# Patient Record
Sex: FEMALE | Race: WHITE | NOT HISPANIC OR LATINO | Employment: OTHER | URBAN - METROPOLITAN AREA
[De-identification: names, ages, dates, MRNs, and addresses within clinical notes are randomized per-mention and may not be internally consistent; named-entity substitution may affect disease eponyms.]

---

## 2017-04-25 ENCOUNTER — ALLSCRIPTS OFFICE VISIT (OUTPATIENT)
Dept: OTHER | Facility: OTHER | Age: 67
End: 2017-04-25

## 2017-04-25 ENCOUNTER — TRANSCRIBE ORDERS (OUTPATIENT)
Dept: ADMINISTRATIVE | Facility: HOSPITAL | Age: 67
End: 2017-04-25

## 2017-04-25 DIAGNOSIS — Z12.31 VISIT FOR SCREENING MAMMOGRAM: Primary | ICD-10-CM

## 2017-04-25 DIAGNOSIS — E89.0 POSTPROCEDURAL HYPOTHYROIDISM: ICD-10-CM

## 2017-04-25 DIAGNOSIS — Z13.220 ENCOUNTER FOR SCREENING FOR LIPOID DISORDERS: ICD-10-CM

## 2017-04-25 DIAGNOSIS — Z13.0 ENCOUNTER FOR SCREENING FOR DISEASES OF THE BLOOD AND BLOOD-FORMING ORGANS AND CERTAIN DISORDERS INVOLVING THE IMMUNE MECHANISM: ICD-10-CM

## 2017-04-25 DIAGNOSIS — Z12.31 ENCOUNTER FOR SCREENING MAMMOGRAM FOR MALIGNANT NEOPLASM OF BREAST: ICD-10-CM

## 2017-04-29 ENCOUNTER — LAB CONVERSION - ENCOUNTER (OUTPATIENT)
Dept: OTHER | Facility: OTHER | Age: 67
End: 2017-04-29

## 2017-04-29 LAB
BASOPHILS # BLD AUTO: 0.5 %
BASOPHILS # BLD AUTO: 21 CELLS/UL (ref 0–200)
CHOLEST SERPL-MCNC: 260 MG/DL (ref 125–200)
CHOLEST/HDLC SERPL: 5.2 (CALC)
DEPRECATED RDW RBC AUTO: 12.6 % (ref 11–15)
EOSINOPHIL # BLD AUTO: 2.2 %
EOSINOPHIL # BLD AUTO: 90 CELLS/UL (ref 15–500)
HCT VFR BLD AUTO: 35.9 % (ref 35–45)
HDLC SERPL-MCNC: 50 MG/DL
HGB BLD-MCNC: 12.4 G/DL (ref 11.7–15.5)
LDL CHOLESTEROL (HISTORICAL): 172 MG/DL (CALC)
LYMPHOCYTES # BLD AUTO: 1554 CELLS/UL (ref 850–3900)
LYMPHOCYTES # BLD AUTO: 37.9 %
MCH RBC QN AUTO: 30 PG (ref 27–33)
MCHC RBC AUTO-ENTMCNC: 34.5 G/DL (ref 32–36)
MCV RBC AUTO: 87 FL (ref 80–100)
MONOCYTES # BLD AUTO: 369 CELLS/UL (ref 200–950)
MONOCYTES (HISTORICAL): 9 %
NEUTROPHILS # BLD AUTO: 2066 CELLS/UL (ref 1500–7800)
NEUTROPHILS # BLD AUTO: 50.4 %
NON-HDL-CHOL (CHOL-HDL) (HISTORICAL): 210 MG/DL (CALC)
PLATELET # BLD AUTO: 229 THOUSAND/UL (ref 140–400)
PMV BLD AUTO: 9.7 FL (ref 7.5–12.5)
RBC # BLD AUTO: 4.13 MILLION/UL (ref 3.8–5.1)
T4 TOTAL (HISTORICAL): 8.6 MCG/DL (ref 4.5–12)
TRIGL SERPL-MCNC: 190 MG/DL
TSH SERPL DL<=0.05 MIU/L-ACNC: 0.52 MIU/L (ref 0.4–4.5)
WBC # BLD AUTO: 4.1 THOUSAND/UL (ref 3.8–10.8)

## 2017-05-01 ENCOUNTER — GENERIC CONVERSION - ENCOUNTER (OUTPATIENT)
Dept: OTHER | Facility: OTHER | Age: 67
End: 2017-05-01

## 2017-05-25 ENCOUNTER — HOSPITAL ENCOUNTER (OUTPATIENT)
Dept: MAMMOGRAPHY | Facility: MEDICAL CENTER | Age: 67
Discharge: HOME | End: 2017-05-25
Payer: MEDICARE

## 2017-05-25 DIAGNOSIS — Z12.31 VISIT FOR SCREENING MAMMOGRAM: ICD-10-CM

## 2017-05-25 PROCEDURE — 77063 BREAST TOMOSYNTHESIS BI: CPT

## 2017-05-25 PROCEDURE — G0202 SCR MAMMO BI INCL CAD: HCPCS

## 2017-05-26 ENCOUNTER — GENERIC CONVERSION - ENCOUNTER (OUTPATIENT)
Dept: OTHER | Facility: OTHER | Age: 67
End: 2017-05-26

## 2018-01-10 NOTE — RESULT NOTES
Verified Results  (1) LIPID PANEL, FASTING 28Apr2017 02:22PM Reginald Estrada     Test Name Result Flag Reference   CHOLESTEROL, TOTAL 260 mg/dL H 125-200   HDL CHOLESTEROL 50 mg/dL  > OR = 46   TRIGLICERIDES 521 mg/dL H <150   LDL-CHOLESTEROL 172 mg/dL (calc) H <130   Desirable range <100 mg/dL for patients with CHD or  diabetes and <70 mg/dL for diabetic patients with  known heart disease  CHOL/HDLC RATIO 5 2 (calc) H < OR = 5 0   NON HDL CHOLESTEROL 210 mg/dL (calc) H    Target for non-HDL cholesterol is 30 mg/dL higher than   LDL cholesterol target  (1) CBC/PLT/DIFF 28Apr2017 02:22PM Reginald Estrada     Test Name Result Flag Reference   WHITE BLOOD CELL COUNT 4 1 Thousand/uL  3 8-10 8   RED BLOOD CELL COUNT 4 13 Million/uL  3 80-5 10   HEMOGLOBIN 12 4 g/dL  11 7-15 5   HEMATOCRIT 35 9 %  35 0-45 0   MCV 87 0 fL  80 0-100 0   MCH 30 0 pg  27 0-33 0   MCHC 34 5 g/dL  32 0-36 0   RDW 12 6 %  11 0-15 0   PLATELET COUNT 003 Thousand/uL  140-400   ABSOLUTE NEUTROPHILS 2066 cells/uL  5399-6870   ABSOLUTE LYMPHOCYTES 1554 cells/uL  850-3900   ABSOLUTE MONOCYTES 369 cells/uL  200-950   ABSOLUTE EOSINOPHILS 90 cells/uL     ABSOLUTE BASOPHILS 21 cells/uL  0-200   NEUTROPHILS 50 4 %     LYMPHOCYTES 37 9 %     MONOCYTES 9 0 %     EOSINOPHILS 2 2 %     BASOPHILS 0 5 %     MPV 9 7 fL  7 5-12 5     (1) THYROXINE 28Apr2017 02:22PM Reginald Estrada     Test Name Result Flag Reference   T4 (THYROXINE), TOTAL 8 6 mcg/dL  4 5-12 0     (Q) TSH, 3RD GENERATION 28Apr2017 02:22PM Reginald Estrada   REPORT COMMENT:  FASTING:YES     Test Name Result Flag Reference   TSH 0 52 mIU/L  0 40-4 50       Plan  Health Maintenance    · (1) LIPID PANEL, FASTING ; every 5 years;  Last 28Apr2017; Status:Active

## 2018-01-10 NOTE — RESULT NOTES
Verified Results  MAMMO SCREENING BILATERAL W 3D & CAD [id number] 07:28AM Carlos Morales Order Number: [id number]     Test Name Result Flag Reference   MAMMO SCREENING BILATERAL W 3D & CAD (Report)     Patient History:   Patient is postmenopausal and has history of other cancer at age    42  Family history of breast cancer in mother at age [de-identified]  Patient has never smoked  Patient's BMI is 20 9  Reason for exam: screening (asymptomatic)  Mammo Screening Bilateral W DBT and CAD: May 3, 2016 - Check In    #: [de-identified]   2D/3D Procedure   3D views: Bilateral MLO view(s) were taken  2D views: Bilateral CC view(s) were taken  Technologist: LOGAN Chavis (LOGAN)(M)   Prior study comparison: May 7, 2015, bilateral AMA dig scrn mamm    w/CAD performed at [place] [address]  May 6, 2014, bilateral digital screening mammogram performed at    [place] [address]  April 30, 2013,    bilateral digital screening mammogram performed at [place] [address]  April 27, 2012, bilateral    digital screening mammogram performed at [place] [address]  March 3, 2011, bilateral digital    screening mammogram performed at [place] [address]  There are scattered fibroglandular densities  A combination of    mediolateral oblique 3D tomographic slices as well as standard    two-dimensional orthogonal images were obtained  No new dominant soft tissue mass, architectural distortion or    suspicious calcifications are noted  The skin and nipple    structures are within normal limits  Stable nodular density    present in the right breast, UIQ  No mammographic evidence of malignancy  No    significant changes when compared with prior studies  ASSESSMENT: BiRad:2 - Benign     Recommendation:   Routine screening mammogram of both breasts in 1 year     8-10% of cancers will be missed on mammography  Management of a    palpable abnormality must be based on clinical grounds  Patients    will be notified of their results via letter from our facility  Accredited by [employer] and FDA       Transcription Location: LOGAN Sharma 98: [id number]     Risk Value(s):   Broer-Cuesdras 10 Year: 4 338%, Dennys-Ang Lifetime: 9 309%,    Myriad Table: 1 5%, HUDSON 5 Year: 2 8%, NCI Lifetime: 11 1%   Signed by:   Maria Liu MD   5/3/16

## 2018-01-11 NOTE — PROGRESS NOTES
Assessment    1  Encounter for preventive health examination (V70 0) (Z00 00)   2  Postsurgical hypothyroidism (244 0) (E89 0)   3  Left shoulder pain (719 41) (M25 512)   4  Screening for deficiency anemia (V78 1) (Z13 0)    Plan  Health Maintenance    · Multiple Vitamins Oral Tablet; Take one daily   · (1) OCCULT BLOOD, FECAL IMMUNOCHEMICAL TEST; Status:Active; Requested  for:11Apr2016;    · (1) TSH; Status:Active;  Requested for:11Apr2016;    · *VB-Depression Screening; Status:Resulted - Requires Verification;   Done: 11Apr2016  04:03PM   · Alcohol misuse can be a problem with advancing age ; Status:Complete;   Done:  11Apr2016   · Always use a seat belt and shoulder strap when riding or driving a motor vehicle ;  Status:Complete;   Done: 74EEG7282   · Brush your teeth freq1 and floss at least once a day ; Status:Complete;   Done:  49JOY9387   · Decreasing the stress in your life may help your condition improve ; Status:Complete;    Done: 61OXZ8320   · Diets that are low in carbohydrates and high in protein are very popular for weight loss ;  Status:Complete;   Done: 04NVQ6653   · Diets that are low in carbohydrates and high in protein are very popular for weight loss ;  Status:Complete;   Done: 86CGH3663   · Drink plenty of fluids ; Status:Complete;   Done: 68UMZ7436   · Eat a low fat and low cholesterol diet ; Status:Complete;   Done: 13WEU2467   · Eat a low fat and low cholesterol diet ; Status:Complete;   Done: 69QGS1151   · Regular aerobic exercise can help reduce stress ; Status:Complete;   Done: 80LAX0137   · Stretch and warm up your muscles during the first 10 minutes , then cool down your  muscles for the last 10 minutes of exercise ; Status:Complete;   Done: 87SPX1951   · There are many ways to reduce your risk of catching or spreading a sexually transmitted  Infection ; Status:Complete;   Done: 08IHN5589   · Use a sun block product with an SPF of 15 or more ; Status:Complete;   Done:  19VMP2473   · We recommend routine visits to a dentist ; Status:Complete;   Done: 19BXR5843   · We recommend that you follow these steps to lower your risk of osteoporosis  ;  Status:Complete;   Done: 38XMZ1506   · Call [phone] if: You find a new or different kind of lump in your breast ;  Status:Complete;   Done: 13BHO9269   · Call [phone] if: You have any bleeding from the vagina ; Status:Complete;    Done: 48CBF2865   · Call [phone] if: You have any warning signs of skin cancer ; Status:Complete;    Done: 32WLC9858   · Call 911 if: You experience a new kind of chest pain (angina) or pressure ;  Status:Complete;   Done: 62CCU7780   · Stop: Fluzone Intramuscular Suspension   · Stop: Pneumo (Pneumovax)   · Stop: Zoster (Zostavax)  Health Maintenance, Encounter for screening mammogram for malignant neoplasm of  breast    · MAMMO SCREENING BILATERAL W 3D & CAD; Status:Hold For - Scheduling; Requested  for:11Apr2016; Health Maintenance, Postsurgical hypothyroidism    · (1) COMPREHENSIVE METABOLIC PANEL; Status:Active; Requested for:11Apr2016;    · (1) LIPID PANEL, FASTING; Status:Active; Requested for:11Apr2016; Health Maintenance, Screening for deficiency anemia    · (1) CBC/PLT/DIFF; Status:Active; Requested for:11Apr2016;   Left shoulder pain    · Naproxen 500 MG Oral Tablet; 1 PO BID with food for 14 days then prn   · * XR SHOULDER 2+ VIEW LEFT; Status:Active; Requested for:11Apr2016;    · *1 - [facility] Physician Referral  Consult  Status: Active  Requested  for: 11Apr2016  Care Summary provided  : Yes    Discussion/Summary  health maintenance visit Currently, she eats a healthy diet and has an adequate exercise regimen  the risks and benefits of cervical cancer screening were discussed refused ordered hemoccult Breast cancer screening: self breast exam technique was taught and mammogram has been ordered  Colorectal cancer screening: fecal occult blood testing supplies were given   Screening lab work includes hemoglobin, glucose, lipid profile and thyroid function testing  The patient declines immunizations  She was advised to be evaluated by an optometrist and a dentist  Advice and education were given regarding nutrition, aerobic exercise, calcium supplements, vitamin D supplements, alcohol use, sunscreen use, self skin examination, helmet use and seat belt use  Chief Complaint  WORK PHYSICAL      History of Present Illness  HM, Adult Female: The patient is being seen for a health maintenance evaluation  The last health maintenance visit was 1 year(s) ago  Social History: Household members include domestic partner  She is unmarried  Work status: working full time and occupation:   The patient has never smoked cigarettes  She reports rare alcohol use  She has never used illicit drugs  General Health: She has regular dental visits  She denies vision problems  Immunizations status: not up to date   refuses flu shot and pneumonia shot and shingels shots  Lifestyle:  She consumes a diverse and healthy diet  She does not have any weight concerns  She exercises regularly  She does not use tobacco  She denies alcohol use  She denies drug use  Reproductive health: the patient is postmenopausal   she is sexually active  Screening: Cervical cancer screening includes uncertain timing of her last pap smear and refuses  Breast cancer screening includes a mammogram performed 4464, uncertain timing of her last clinical breast exam and monthly breast self-exams performed  She hasn't been previously screened for colorectal cancer  Metabolic screening includes lipid profile performed 2015 by endocrinology, glucose screening performed 2015 by endocrinology and thyroid function test performed 2015 bu endocrinology       Cardiovascular risk factors: no hypertension, no diabetes, no high LDL cholesterol, no low HDL cholesterol, no stress, no obesity, no tobacco use, no illicit drug use, no sedentary lifestyle and no family history of cardiovascular disease  General health risks: no previous breast cancer, no abnormal cervical cytology, no positive screening for human papilloma virus, no hormone therapy, no inflammatory bowel disease, no osteoporosis risk factors and no asbestos exposure  Safety elements used: seat belt, safe driving habits, sunscreen, smoke detector, carbon monoxide detector and hot water temperature less than 120 degrees F    HPI: Patient is here for PE for work Patient declines flu shot, refuses pap and breast exam patient wants a mammogram slip Patient is seeing eye doctor yearly Patient was there today for eye exam Patient had an injury in 8/2015 to the left shoulder Seh was at work sitting in a little chair near the ground when a toddler startled her by running into her and she fell onto her left shoulder Patient had pain initially but then it stopped However now for last 6 weeks she has pain Patient is able to flex shoulder but cannot rotate shoulder to take her coat off or hook her bra Patient has no radiation of the pain Patien tdenies any arm weakness Patient [facility] every June and was there in June 2015 and had all her labs done They fill her thyroid meds for one year at each visit   Shoulder Pain: The patient is being seen for an initial evaluation of shoulder pain  The history today is reported by the patient  The patient is right hand dominant  Left shoulder injury, This occurred 7 month(s) ago at work  The injury resulted from a fall  Symptoms:  shoulder pain, shoulder stiffness and decreased range of motion at the shoulder, but no localized bruising, no localized swelling, no localized redness, no localized warmth, no localized numbness and no localized weakness  The patient is currently experiencing symptoms  No associated symptoms are reported  The patient is not currently being treated for this problem   Pertinent medical history:  no juvenile rheumatoid arthritis, no psoriasis, no inflammatory bowel disease, no bleeding disorder, no chronic corticosteroid use and no prior shoulder dislocation  Family history:  no rheumatologic disease, no psoriasis and no inflammatory bowel disease  The patient is currently able to do activities of daily living with limitations  She was previously evaluated none  Review of Systems    Constitutional: No fever, no chills, feels well, no tiredness, no recent weight gain or weight loss  Eyes: no eye pain and no eyesight problems  ENT: no complaints of earache, no loss of hearing, no nose bleeds, no nasal discharge, no sore throat, no hoarseness  Cardiovascular: No complaints of slow heart rate, no fast heart rate, no chest pain, no palpitations, no leg claudication, no lower extremity edema  Respiratory: No complaints of shortness of breath, no wheezing, no cough, no SOB on exertion, no orthopnea, no PND  Gastrointestinal: No complaints of abdominal pain, no constipation, no nausea or vomiting, no diarrhea, no bloody stools  Genitourinary: No complaints of dysuria, no incontinence, no pelvic pain, no dysmenorrhea, no vaginal discharge or bleeding  Musculoskeletal: as noted in HPI  Integumentary: no rashes  Neurological: No complaints of headache, no confusion, no convulsions, no numbness, no dizziness or fainting, no tingling, no limb weakness, no difficulty walking  Psychiatric: no anxiety, no sleep disturbances and no depression  Active Problems    1  Encounter for screening mammogram for malignant neoplasm of breast (V76 12)   (Z12 31)   2   Postsurgical hypothyroidism (244 0) (E89 0)    Past Medical History    · Acute sinusitis (461 9) (J01 90)   · History of Bleeding hemorrhoids (455 8) (K64 9)   · History of Glaucoma In Both Eyes (365 9)   · History of Glaucoma In Both Eyes (365 9)   · History of glaucoma (V12 49) (Z86 69)   · History of Malignant Thyroid Neoplasm (V10 87)   · History of Osteoarthritis (V13 4)   · History of Vitamin D deficiency (268 9) (E55 9)    Surgical History    · History of Thyroid Surgery Total Thyroidectomy    Family History    · Family history of Cancer   · Family history of Heart Disease (V17 49)    · Family history of Cancer   · Family history of Hypertension (V17 49)   · Family history of Osteoarthritis (V17 7)    Social History    · Caffeine Use   · Never A Smoker    Current Meds   1  Betimol 0 5 % Ophthalmic Solution; instill 1 drop into both eyes every morning; Therapy: (Recorded:02Apr2013) to Recorded   2  Levothyroxine Sodium 88 MCG Oral Tablet; Take 1 tablet daily on empty stomach with   water; Therapy: (Recorded:02Apr2015) to Recorded   3  Travoprost 0 004 % Ophthalmic Solution; INSTILL 1 DROP IN BOTH EYES AT BEDTIME; Therapy: (Recorded:02Apr2013) to Recorded    Allergies    1  Sulfa Drugs    Vitals   Recorded: 68UFP9926 33:87BP   Systolic 484   Diastolic 78   Height 5 ft 2 in   Weight 131 lb 8 oz   BMI Calculated 24 05   BSA Calculated 1 6     Physical Exam    Constitutional   General appearance: No acute distress, well appearing and well nourished  Eyes   Conjunctiva and lids: No swelling, erythema or discharge  Pupils and irises: Equal, round, reactive to light  Ears, Nose, Mouth, and Throat   External inspection of ears and nose: Normal     Otoscopic examination: Tympanic membranes translucent with normal light reflex  Canals patent without erythema  Hearing: Normal     Nasal mucosa, septum, and turbinates: Normal without edema or erythema  Lips, teeth, and gums: Normal, good dentition  Oropharynx: Normal with no erythema, edema, exudate or lesions  Neck   Neck: Supple, symmetric, trachea midline, no masses  Thyroid: Normal, no thyromegaly  Pulmonary   Respiratory effort: No increased work of breathing or signs of respiratory distress  Auscultation of lungs: Clear to auscultation      Cardiovascular   Auscultation of heart: Normal rate and rhythm, normal S1 and S2, no murmurs  Carotid pulses: 2+ bilaterally  Abdominal aorta: Normal     Femoral pulses: 2+ bilaterally  Pedal pulses: 2+ bilaterally  Examination of extremities for edema and/or varicosities: Normal     Abdomen   Abdomen: Non-tender, no masses  Liver and spleen: No hepatomegaly or splenomegaly  Lymphatic   Palpation of lymph nodes in neck: No lymphadenopathy  Palpation of lymph nodes in axillae: No lymphadenopathy  Musculoskeletal   Gait and station: Normal     Digits and nails: Normal without clubbing or cyanosis  Joints, bones, and muscles: Abnormal   left shoulder pain to palpation of the [facility] joint Patient can flex and extend the left shoulder Patient has abduction at 45 degrees Patient cannot external or internally rotate the left shoulder  Skin   Skin and subcutaneous tissue: Normal without rashes or lesions  Palpation of skin and subcutaneous tissue: Normal turgor  Neurologic   Cranial nerves: Cranial nerves II-XII intact  Sensation: No sensory loss  Psychiatric   Judgment and insight: Normal     Orientation to person, place, and time: Normal     Recent and remote memory: Intact  Mood and affect: Normal        Health Management  Health Maintenance   (1) LIPID PANEL, FASTING; every 5 years; Next Due: 22EUG9678; Overdue  BREAST EXAM; every 1 year; Next Due: 72OAL4031; Overdue  COLONOSCOPY; every 10 years; Next Due: 51JBD8745; Overdue    Signatures   Electronically signed by : Madi Villafana DO;  Apr 11 2016  4:05PM EST                       (Author)

## 2018-01-13 VITALS
HEIGHT: 62 IN | SYSTOLIC BLOOD PRESSURE: 118 MMHG | WEIGHT: 133.38 LBS | DIASTOLIC BLOOD PRESSURE: 78 MMHG | BODY MASS INDEX: 24.54 KG/M2

## 2018-01-13 NOTE — PROGRESS NOTES
Assessment    1  Acute sinusitis (461 9) (J01 90)    Plan  Acute sinusitis    · Amoxicillin 875 MG Oral Tablet; TAKE 1 TABLET EVERY 12 HOURS DAILY   · Promethazine-DM 6 25-15 MG/5ML Oral Syrup; TAKE 5 ML Every 6 hours PRN  cough and congestion   · Call [phone] if: After using a fever reducing medication for 24 hours the  temperature is still higher than 103 5 degrees Fahrenheit ; Status:Complete;   Done:  33WDJ0624 10:31AM   · Call [phone] if: The fever comes back after being normal for 2 days ;  Status:Complete;   Done: 15GOP5916 10:31AM   · Call [phone] if: The fever has not gone away in 2 days ; Status:Complete;   Done:  23AWC0869 10:31AM   · Call [phone] if: The sinus pain is not better in 1 week ; Status:Complete;   Done:  64KQK9410 10:31AM   · Call [phone] if: The symptoms are not better in 7 days ; Status:Complete;   Done:  10POS8045 10:31AM   · Call [phone] if: The symptoms come back after the medications are finished ;  Status:Complete;   Done: 09GNO6149 10:31AM   · Call [phone] if: You start vomiting ; Status:Complete;   Done: 80GEM5071 10:31AM   · Call [phone] if: Your sinus pain is worse ; Status:Complete;   Done: 07FAW2278  10:31AM   · Call [phone] if: Your temperature is higher than 101F ; Status:Complete;   Done:  98IUO2416 10:31AM   · Seek Immediate Medical Attention if: You have a fever, headache, and vomiting, or have  a stiff neck ; Status:Complete;   Done: 34ODI3498 10:31AM   · Seek Immediate Medical Attention if: You have a severe headache that will not go away ;  Status:Complete;   Done: 48OGW8744 10:31AM   · Seek Immediate Medical Attention if: You have signs of infection in or around the affected  area ; Status:Complete;   Done: 10SHT1292 10:31AM    Chief Complaint    1   Cold Symptoms  Colon Guzman X 3 DAYS      History of Present Illness  HPI: Patient has had cold symptoms since Sunday Patient is having sore throat , low grade fever Patient did have an episode of nausea, vomiting and diarrhea on one occasion on Sunday       Cold Symptoms:   Associated symptoms include sneezing, nasal congestion, runny nose, post nasal drainage, sore throat, dry cough, facial pressure, facial pain, headache, fever and chills, but no scratchy throat, no hoarseness, no productive cough, no plugged ear(s), no ear pain, no swollen lymph nodes, no wheezing, no shortness of breath, no fatigue and no weakness  Review of Systems    Constitutional: as noted in HPI    ENT: as noted in HPI  Cardiovascular: no chest pain  Respiratory: as noted in HPI  Gastrointestinal: as noted in HPI  Musculoskeletal: as noted in HPI  Integumentary: no rashes  Neurological: as noted in HPI  Active Problems    1  Encounter for screening mammogram for malignant neoplasm of breast (V76 12)   (Z12 31)   2  Postsurgical hypothyroidism (244 0) (E89 0)    Past Medical History    1  History of Bleeding hemorrhoids (455 8) (K64 9)   2  History of Glaucoma In Both Eyes (365 9)   3  History of Glaucoma In Both Eyes (365 9)   4  History of glaucoma (V12 49) (Z86 69)   5  History of Malignant Thyroid Neoplasm (V10 87)   6  History of Osteoarthritis (V13 4)   7  History of Vitamin D deficiency (268 9) (E55 9)  Active Problems And Past Medical History Reviewed: The active problems and past medical history were reviewed and updated today  Family History    1  Family history of Cancer   2  Family history of Heart Disease (V17 49)    3  Family history of Cancer   4  Family history of Hypertension (V17 49)   5  Family history of Osteoarthritis (V17 7)  Family History Reviewed: The family history was reviewed and updated today  Social History    · Caffeine Use   · Never A Smoker  The social history was reviewed and is unchanged  Surgical History    1  History of Thyroid Surgery Total Thyroidectomy  Surgical History Reviewed:    The surgical history was reviewed and updated today  Current Meds   1  Betimol 0 5 % Ophthalmic Solution; instill 1 drop into both eyes every morning; Therapy: (Recorded:02Apr2013) to Recorded   2  Levothyroxine Sodium 88 MCG Oral Tablet; Take 1 tablet daily on empty stomach with   water; Therapy: (Recorded:02Apr2015) to Recorded   3  Travoprost 0 004 % Ophthalmic Solution; INSTILL 1 DROP IN BOTH EYES AT BEDTIME; Therapy: (Recorded:02Apr2013) to Recorded    The medication list was reviewed and updated today  Allergies    1  Sulfa Drugs    Vitals   Recorded: 57VNR1526 09:49AM   Temperature 81 4 F   Systolic 954   Diastolic 82   Height 5 ft 2 in   Weight 134 lb    BMI Calculated 24 51   BSA Calculated 1 61     Physical Exam    Constitutional   General appearance: No acute distress, well appearing and well nourished  Eyes   Conjunctiva and lids: No swelling, erythema or discharge  Pupils and irises: Equal, round and reactive to light  Ears, Nose, Mouth, and Throat   External inspection of ears and nose: Normal     Otoscopic examination: Tympanic membranes translucent with normal light reflex  Canals patent without erythema  Nasal mucosa, septum, and turbinates: Abnormal   thick rhinorreha  Oropharynx: Abnormal   PND  Pulmonary   Respiratory effort: No increased work of breathing or signs of respiratory distress  Auscultation of lungs: Clear to auscultation  Cardiovascular   Auscultation of heart: Normal rate and rhythm, normal S1 and S2, without murmurs  Examination of extremities for edema and/or varicosities: Normal     Carotid pulses: Normal     Abdomen   Abdomen: Non-tender, no masses  Liver and spleen: No hepatomegaly or splenomegaly  Musculoskeletal   Gait and station: Normal     Skin   Skin and subcutaneous tissue: Normal without rashes or lesions  Neurologic   Cranial nerves: Cranial nerves 2-12 intact      Psychiatric   Orientation to person, place, and time: Normal  Mood and affect: Normal          Signatures   Electronically signed by : Felix Ruiz DO; Jan 19 2016 10:32AM EST                       (Author)

## 2020-08-19 ENCOUNTER — TRANSCRIBE ORDERS (OUTPATIENT)
Dept: ADMINISTRATIVE | Facility: HOSPITAL | Age: 70
End: 2020-08-19

## 2020-08-19 DIAGNOSIS — Z12.31 VISIT FOR SCREENING MAMMOGRAM: Primary | ICD-10-CM
